# Patient Record
Sex: FEMALE | Race: WHITE
[De-identification: names, ages, dates, MRNs, and addresses within clinical notes are randomized per-mention and may not be internally consistent; named-entity substitution may affect disease eponyms.]

---

## 2017-01-03 ENCOUNTER — HOSPITAL ENCOUNTER (OUTPATIENT)
Dept: HOSPITAL 62 - WI | Age: 62
End: 2017-01-03
Attending: SPECIALIST
Payer: COMMERCIAL

## 2017-01-03 DIAGNOSIS — Z12.31: Primary | ICD-10-CM

## 2017-01-03 PROCEDURE — 77067 SCR MAMMO BI INCL CAD: CPT

## 2017-01-03 PROCEDURE — G0202 SCR MAMMO BI INCL CAD: HCPCS

## 2017-01-04 NOTE — WOMENS IMAGING REPORT
EXAM DESCRIPTION:  BILAT SCREENING MAMMO W/CAD



COMPLETED DATE/TIME:  1/3/2017 10:09 am



REASON FOR STUDY:  Z12.31 ROUTINE SCREENING MAMMO Z12.31  ENCNTR SCREEN MAMMOGRAM FOR MALIGNANT NEOPL
ASM OF ALEJANDRA



COMPARISON:  Multiple since 2009



TECHNIQUE:  Standard craniocaudal and mediolateral oblique views of each breast recorded using MR Prestaa
l acquisition.



LIMITATIONS:  None.



FINDINGS:  Findings present which are benign by mammographic criteria.  No suspicious masses, calcifi
cations or architectural distortion.

Read with the assistance of CAD.

.[facility] Cenova Version 1.3

.[facility] - R2 Cenova Version 1.3

.[facility] - R2 Cenova Version 2.4

.[facility] - R2 Cenova Version 2.4

.[facility] - R2  Version 9.2

Benign mammographic findings may include one or more of the following:  Smooth masses, popcorn/rim/co
arse calcifications, asymmetries, post-procedure changes, and lesions with long-standing stability.



BREAST DENSITY:  d. The breasts are extremely dense, which lowers the sensitivity of mammography.



BIRAD:  2 BENIGN FINDING(S)



RECOMMENDATION:  ROUTINE SCREENING

Please consider bilateral screening tomosynthesis in January 2017 given extremely dense fibroglandula
r tissue



COMMENT:  PATIENT NOTIFIED BY LETTER

The American College of Radiology recommends an annual screening mammogram for women aged 40 years or
 over.  Each patient will receive a reminder prior to the anniversary date of her mammogram.

The American College of Radiology (ACR) has developed recommendations for screening MRI of the breast
s in certain patient populations, to be used in conjunction with mammography.  Breast MRI surveillanc
e may be appropriate for women with more than 20% lifetime risk of developing breast cancer  as deter
mined by genetic testing, significant family history of the disease, or history of mantle radiation f
or Hodgkins Disease.  ACR Practice Guidelines 2008.



TECHNICAL DOCUMENTATION:  FINDING NUMBER: (1)

ASSESSMENT: (1)

JOB ID:  [id number]

 2011 [employer]- All Rights Reserved

## 2018-01-23 ENCOUNTER — HOSPITAL ENCOUNTER (OUTPATIENT)
Dept: HOSPITAL 62 - WI | Age: 63
End: 2018-01-23
Attending: SPECIALIST
Payer: COMMERCIAL

## 2018-01-23 DIAGNOSIS — Z12.31: Primary | ICD-10-CM

## 2018-01-23 PROCEDURE — 77063 BREAST TOMOSYNTHESIS BI: CPT

## 2018-01-23 PROCEDURE — 77067 SCR MAMMO BI INCL CAD: CPT

## 2018-01-23 NOTE — WOMENS IMAGING REPORT
EXAM DESCRIPTION:  3D SCREENING MAMMO BILAT



COMPLETED DATE/TIME:  1/23/2018 12:11 pm



REASON FOR STUDY:  ROUTINE SCREENING; Z12.31 Z12.31  ENCNTR SCREEN MAMMOGRAM FOR MALIGNANT NEOPLASM O
F ALEJANDRA



COMPARISON:  MULTIPLE SINCE 2009



TECHNIQUE:  Standard craniocaudal and mediolateral oblique views of each breast recorded using digita
l acquisition and breast tomosynthesis.



LIMITATIONS:  None.



FINDINGS:  Findings present which are benign by mammographic criteria.  No suspicious masses, calcifi
cations or architectural distortion.

Pertinent benign findings: Left breast biopsy clip upper outer quadrant

Read with the assistance of CAD.

.[facility] - R2 Cenova Version 1.3

.[facility] - R2 Cenova Version 1.3

.[facility] - R2 Cenova Version 2.4

.[facility] - R2 Cenova Version 2.4

.[facility] - R2  Version 9.2

Benign mammographic findings may include one or more of the following:  Smooth masses, popcorn/rim/co
arse calcifications, asymmetries, post-procedure changes, and lesions with long-standing stability.



IMPRESSION:  BENIGN MAMMOGRAPHIC FINDINGS.  BIRADS 2



BREAST DENSITY:  c. The breasts are heterogeneously dense, which may obscure small masses.



BIRAD:  2 BENIGN FINDING(S)



RECOMMENDATION:  RECOMMENDATION: ROUTINE SCREENING

PLEASE CONTINUE YEARLY BILATERAL SCREENING TOMOSYNTHESIS IN JANUARY 2019



COMMENT:  The patient has been notified of the results by letter per SA requirements. Additional no
tification policies are in place for contacting patient with suspicious or incomplete findings.

Quality ID #225: The American College of Radiology recommends an annual screening mammogram for women
 aged 40 years or over. This facility utilizes a reminder system to ensure that all patients receive 
reminder letters, and/or direct phone calls for appointments. This includes reminders for routine scr
eening mammograms, diagnostic mammograms, or other Breast Imaging Interventions when appropriate.  Th
is patient will be placed in the appropriate reminder system.

The American College of Radiology (ACR) has developed recommendations for screening MRI of the breast
s in certain patient populations, to be used in conjunction with mammography.  Breast MRI surveillanc
e may be appropriate for women with more than 20% lifetime risk of developing breast cancer  as deter
mined by genetic testing, significant family history of the disease, or history of mantle radiation f
or Hodgkins Disease.  ACR Practice Guidelines 2008.

[employer]



PQRS 6045F: Fluoroscopic imaging is not utilized for breast tomosynthesis.



TECHNICAL DOCUMENTATION:  FINDING NUMBER: (1)

ASSESSMENT: (1)

JOB ID:  [id number]

 2011 [employer]- All Rights Reserved

## 2019-01-29 ENCOUNTER — HOSPITAL ENCOUNTER (OUTPATIENT)
Dept: HOSPITAL 62 - WI | Age: 64
End: 2019-01-29
Attending: PHYSICIAN ASSISTANT
Payer: COMMERCIAL

## 2019-01-29 DIAGNOSIS — Z12.31: Primary | ICD-10-CM

## 2019-01-29 PROCEDURE — 77067 SCR MAMMO BI INCL CAD: CPT

## 2019-01-29 PROCEDURE — 77063 BREAST TOMOSYNTHESIS BI: CPT

## 2019-01-29 NOTE — WOMENS IMAGING REPORT
EXAM DESCRIPTION:  3D SCREENING MAMMO BILAT



COMPLETED DATE/TIME:  1/29/2019 2:15 pm



REASON FOR STUDY:  ROUTINE BILATERAL SCREENING;Z12.31 Z12.31  ENCNTR SCREEN MAMMOGRAM FOR MALIGNANT N
EOPLASM OF ALEJANDRA



COMPARISON:  [id number]



TECHNIQUE:  Standard craniocaudal and mediolateral oblique views of each breast recorded using digita
l acquisition and breast tomosynthesis.



LIMITATIONS:  None.



FINDINGS:  No masses, calcifications or architectural distortion. No areas of suspicion.

Read with the assistance of CAD.

.[facility] - R2 Cenova Version 1.3

.[facility] - R2 Cenova Version 1.3

.[facility] - R2 Cenova Version 2.4

.[facility] - R2 Cenova Version 2.4

.[facility] - R2  Version 9.2



IMPRESSION:  NORMAL MAMMOGRAM.  BIRADS 1.



BREAST DENSITY:  c. The breasts are heterogeneously dense, which may obscure small masses.



BIRAD:  1 NEGATIVE



RECOMMENDATION:  ROUTINE SCREENING



COMMENT:  The patient has been notified of the results by letter per SA requirements. Additional no
tification policies are in place for contacting patient with suspicious or incomplete findings.

Quality ID #225: The American College of Radiology recommends an annual screening mammogram for women
 aged 40 years or over. This facility utilizes a reminder system to ensure that all patients receive 
reminder letters, and/or direct phone calls for appointments. This includes reminders for routine scr
eening mammograms, diagnostic mammograms, or other Breast Imaging Interventions when appropriate.  Th
is patient will be placed in the appropriate reminder system.

The American College of Radiology (ACR) has developed recommendations for screening MRI of the breast
s in certain patient populations, to be used in conjunction with mammography.  Breast MRI surveillanc
e may be appropriate for women with more than 20% lifetime risk of developing breast cancer  as deter
mined by genetic testing, significant family history of the disease, or history of mantle radiation f
or Hodgkins Disease.  ACR Practice Guidelines 2008.

[employer]



PQRS 6045F: Fluoroscopic imaging is not utilized for breast tomosynthesis.



TECHNICAL DOCUMENTATION:  FINDING NUMBER: (1)

ASSESSMENT:  (1)

JOB ID:  [id number]

 2011 [employer]- All Rights Reserved



Reading location - IP/workstation name: [email or web address]

## 2020-01-30 ENCOUNTER — HOSPITAL ENCOUNTER (OUTPATIENT)
Dept: HOSPITAL 62 - WI | Age: 65
End: 2020-01-30
Attending: PHYSICIAN ASSISTANT
Payer: COMMERCIAL

## 2020-01-30 DIAGNOSIS — Z12.31: Primary | ICD-10-CM

## 2020-01-30 DIAGNOSIS — Z78.0: ICD-10-CM

## 2020-01-30 PROCEDURE — 77063 BREAST TOMOSYNTHESIS BI: CPT

## 2020-01-30 PROCEDURE — 77080 DXA BONE DENSITY AXIAL: CPT

## 2020-01-30 PROCEDURE — 77067 SCR MAMMO BI INCL CAD: CPT

## 2020-01-30 NOTE — WOMENS IMAGING REPORT
EXAM DESCRIPTION:  3D SCREENING MAMMO BILAT



COMPLETED DATE/TIME:  1/30/2020 10:24 am



REASON FOR STUDY:  Z12.31 SCREENING MAMMO Z12.31  ENCNTR SCREEN MAMMOGRAM FOR MALIGNANT NEOPLASM OF B
RE Z78.0  ASYMPTOMATIC MENOPAUSAL STATE



COMPARISON:  2014 to 2019



EXAM PARAMETERS:  Views: Standard craniocaudal and mediolateral oblique views of each breast recorded
 using digital acquisition and breast tomosynthesis.

Read with the assistance of CAD.

.[facility] - AMDL  Version 9.2



LIMITATIONS:  None.



FINDINGS:  No suspicious masses, suspicious calcifications or architectural distortion. No areas of c
oncern.



IMPRESSION:   NEGATIVE MAMMOGRAM. BIRADS 1.



BREAST DENSITY:  c. The breasts are heterogeneously dense, which may obscure small masses.



BIRAD:  ASSESSMENT:  1 NEGATIVE



RECOMMENDATION:  ROUTINE SCREENING



COMMENT:  The patient has been notified of the results by letter per MQSA requirements. Additional no
tification policies are in place for contacting patient with suspicious or incomplete findings.

Quality ID #225: The American College of Radiology recommends an annual screening mammogram for women
 aged 40 years or over. This facility utilizes a reminder system to ensure that all patients receive 
reminder letters, and/or direct phone calls for appointments. This includes reminders for routine scr
eening mammograms, diagnostic mammograms, or other Breast Imaging Interventions when appropriate.  Th
is patient will be placed in the appropriate reminder system.



TECHNICAL DOCUMENTATION:  FINDING NUMBER: (1)

ASSESSMENT:  (1)

JOB ID:  [id number]

 2011 [employer]- All Rights Reserved



Reading location - IP/workstation name: Unknown

## 2020-01-30 NOTE — WOMENS IMAGING REPORT
EXAM DESCRIPTION:  BONE DENSITY HIP/SPINE



COMPLETED DATE/TIME:  1/30/2020 10:24 am



REASON FOR STUDY:  Z78.0 BONE DENSITY Z12.31  ENCNTR SCREEN MAMMOGRAM FOR MALIGNANT NEOPLASM OF ALEJANDRA Z
78.0  ASYMPTOMATIC MENOPAUSAL STATE



COMPARISON:   None.



TECHNIQUE:  Dual-Energy X-ray Absorptiometry (DEXA) of the AP Spine and Hip.



LIMITATIONS:  None.



FINDINGS:  LUMBAR SPINE:

The bone mineral density (BMD) measured from L1-L4 in the AP projection correlates with a T-score of 
2.0, which is NORMAL as defined by the World Health Organization.

BMD Change vs Baseline:  N/A

HIP:

The bone mineral density (BMD) measured in the LEFT FEMORAL NECK AT THE hip correlates with a T-score
 of -1.3, which is osteopenic as defined by the World Health Organization.

BMD Change vs Baseline:  N/A

10 year Fracture Risk Assessment:

Major Osteoporotic Fracture:  7.5%

Hip Fracture:  0.6%



IMPRESSION:  1. LUMBAR SPINE WHO CLASSIFICATION: Normal

2. HIP WHO CLASSIFICATION: Osteopenic

OVERALL ASSESSMENT:

WHO CLASSIFICATION:  Osteopenic



COMMENT:  The World Health Organization defines low BMD as follows:

T-score:

Normal:  Greater than -1.0

Osteopenia: Between -1.0 and -2.5

Osteoporosis:  Less than -2.5 without fractures

Established osteoporosis:  Less than -2.5 with fractures

In general, you may wish to consider:

Diagnosis          Treatment                     Follow-up DEXA

Normal BMD      Prevention                    2-3 years

Osteopenia       Prevention/Therapy        1-2 years

Osteoporosis     Therapy                        Yearly



TECHNICAL DOCUMENTATION:  JOB ID:  [id number]

 2011 [employer]- All Rights Reserved



Reading location - IP/workstation name: [email or web address]

## 2020-02-01 ENCOUNTER — HOSPITAL ENCOUNTER (OUTPATIENT)
Dept: HOSPITAL 62 - RAD | Age: 65
End: 2020-02-01
Attending: OTOLARYNGOLOGY
Payer: COMMERCIAL

## 2020-02-01 DIAGNOSIS — H90.12: Primary | ICD-10-CM

## 2020-02-01 PROCEDURE — 70482 CT ORBIT/EAR/FOSSA W/O&W/DYE: CPT

## 2020-02-03 NOTE — RADIOLOGY REPORT (SQ)
EXAM DESCRIPTION:  CT TEMPORAL BONES WO



COMPLETED DATE/TIME:  2/3/2020 12:58 pm



REASON FOR STUDY:  CONDUCTIVE HEARING LOSS/UNILATERAL LEFT H90.12  CONDCTV HEAR LOSS, UNI, LEFT EAR, 
W UNRESTR HEAR CNTR



COMPARISON:  None.



TECHNIQUE:  Noncontrasted thin section axial images through the temporal bones and skull base were ob
tained and reviewed at bone windows and bone algorithm with coronal and sagittal reconstructions.

All CT scanners at this facility use dose modulation, iterative reconstruction, and/or weight based d
osing when appropriate to reduce radiation dose to as low as reasonably achievable (ALARA).

[facility]: Dose Right  [facility]: CareDose    [facility]: Dose Right    [facility]: Teradose 4D    [facility]: Smart Technologies



RADIATION DOSE:  16.8 mGy.



LIMITATIONS:  None.



FINDINGS:  RIGHT SIDE:

EXTERNAL AUDITORY CANAL: Widely patent.

TYMPANIC MEMBRANE: No masses, thickening or medial retraction.

OSSICLES AND MIDDLE EAR CAVITY: Normal ossicles.  No middle ear masses or fluid.

INNER EAR STRUCTURES: Normal vestibule and cochlea.  Normal aqueducts.

INTERNAL AUDITORY CANAL: Normal bony canal without narrowing or widening.  No calcified or ossified m
asses.

TEMPOROMANDIBULAR JOINT: Osteoarthritis.

MASTOID AIR CELLS: Clear.

LEFT SIDE:

EXTERNAL AUDITORY CANAL: Widely patent.

TYMPANIC MEMBRANE: No masses, thickening or medial retraction.

OSSICLES AND MIDDLE EAR CAVITY: Normal ossicles.  No middle ear masses or fluid.

INNER EAR STRUCTURES: Normal vestibule and cochlea.  Normal aqueducts.

INTERNAL AUDITORY CANAL: Normal bony canal without narrowing or widening.  No calcified or ossified m
asses.

TEMPOROMANDIBULAR JOINT: Osteoarthritis.

MASTOID AIR CELLS: Clear.

CENTRAL SKULL BASE: Normal foramina.  No lytic or blastic lesions.

INFERIOR BRAIN: Limited view.  No acute findings.

LIMITED VIEW OF PARANASAL SINUSES IN THE FIELD OF VIEW: Normal.



IMPRESSION:  UNREMARKABLE NONCONTRASTED TEMPORAL BONE CT.



TECHNICAL DOCUMENTATION:  JOB ID:  [id number]

Quality ID # 436: Final reports with documentation of one or more dose reduction techniques (e.g., Au
tomated exposure control, adjustment of the mA and/or kV according to patient size, use of iterative 
reconstruction technique)

 2011 [employer]- All Rights Reserved



Reading location - IP/workstation name: [facility]

## 2020-02-06 ENCOUNTER — HOSPITAL ENCOUNTER (OUTPATIENT)
Dept: HOSPITAL 62 - END | Age: 65
Discharge: HOME | End: 2020-02-06
Attending: SURGERY
Payer: COMMERCIAL

## 2020-02-06 VITALS — SYSTOLIC BLOOD PRESSURE: 109 MMHG | DIASTOLIC BLOOD PRESSURE: 67 MMHG

## 2020-02-06 DIAGNOSIS — Z12.11: Primary | ICD-10-CM

## 2020-02-06 DIAGNOSIS — Z88.0: ICD-10-CM

## 2020-02-06 DIAGNOSIS — Z88.3: ICD-10-CM

## 2020-02-06 DIAGNOSIS — D64.9: ICD-10-CM

## 2020-02-06 DIAGNOSIS — Z86.010: ICD-10-CM

## 2020-02-06 DIAGNOSIS — Z79.899: ICD-10-CM

## 2020-02-06 DIAGNOSIS — I10: ICD-10-CM

## 2020-02-06 DIAGNOSIS — Z88.8: ICD-10-CM

## 2020-02-06 PROCEDURE — 45378 DIAGNOSTIC COLONOSCOPY: CPT

## 2020-02-06 PROCEDURE — 00812 ANES LWR INTST SCR COLSC: CPT

## 2020-02-06 NOTE — OPERATIVE REPORT
Operative Report


DATE OF SURGERY: 02/06/20


PREOPERATIVE DIAGNOSIS: 1.  History of hyperplastic polyp of the colon


POSTOPERATIVE DIAGNOSIS: Normal colonoscopy


OPERATION: Total colonoscopy to cecum with photodocumentation


SURGEON: SIM MCNEILL


ANESTHESIA: LMAC


TISSUE REMOVED OR ALTERED: None


COMPLICATIONS: 





None


ESTIMATED BLOOD LOSS: None


INTRAOPERATIVE FINDINGS: See below


PROCEDURE: 





Obtaining informed consent the patient was taken from the preoperative holding 

area to the main endoscopy suite where monitoring devices were attached to  the 

patient.  Plan and surgical timeout were conducted


 


The patient was placed in the left lateral decubitus  position with knees to 

chest.  A perianal examination was performed.  There was no visible or palpable 

anorectal pathology.  Sphincter tone was felt to be normal.


 


The flexible adult colonoscope was advanced through the anal rectal canal, all 

the way to the cecum.  Visualization  of the cecum was achieved by demonstration

of the ileocecal valve, the appendiceal orifice and transillumination of the 

anterior abdominal wall. 





 This was an excellent study on the well-prepped bowel.  The colonoscope was 

withdrawn slowly and methodically checked and the mucosa carefully.  There was 

no evidence of tumor, stricture, bleeding or polyp.  There was no evidence of 

diverticuloses.  The scope was slowly withdrawn through the anal rectal canal.  

Complete visualization of the rectum was achieved with photodocumentation.


 


The scope was withdrawn to the patient's anus.  The patient tolerated the 

procedure well and was taken to the recovery area in stable condition.





Per surveillance guidelines, patient will be an appropriate candidate for 

follow-up colonoscopy in  5   years.

## 2020-02-06 NOTE — DISCHARGE SUMMARY
Discharge Summary (SDC)





- Discharge


Final Diagnosis: 





Normal colonoscopy


Date of Surgery: 02/06/20


Discharge Date: 02/06/20


Condition: Good


Forms:  EU Anesthesia D/C Instructions, Discharge POC-Surgical Service


Treatment or Instructions: 


               





                  [address] [address]


                  Phone: [phone]    Fax:  [phone]








            


            POST ENDOSCOPY DISCHARGE INSTRUCTIONS








1.  Diet:  Start clear liquids that a regular diet as tolerated.





2.  Resume all preoperative medications.  All oral anticoagulants and aspirins 

can be resumed 24 hours after procedure.





3.  If a polypectomy was performed some bleeding per rectum may occur.  This 

should stop within 3 days.  If not, please contact the office.





4.  If you had a colonoscopy you may experience some bloating and delayed return

of normal bowel function for several days, your regular bowel movement pattern 

should resume within a week.





5.  Please contact [facility] at [phone] to make an 

appointment with Dr. Flores for 1 to 3 weeks following procedure.





6.  If you have any questions or concerns regarding your care,treatment plan or 

follow up, please contact our office.





7.  Per clinical guidelines we recommend you undergo a repeat colonoscopy in 5 

years.


Referrals: 


SIM FLORES MD [ACTIVE STAFF] - 


Discharge Activity: Activity As Tolerated


Home Care Assistance: None Needed


Report the Following to Your Physician Immediately: Shortness of Breath, 

Increase in Pain, Fever over 101 Degrees